# Patient Record
(demographics unavailable — no encounter records)

---

## 2025-04-03 NOTE — HISTORY OF PRESENT ILLNESS
[Patient reported mammogram was normal] : Patient reported mammogram was normal [Patient reported breast sonogram was normal] : Patient reported breast sonogram was normal [Patient reported PAP Smear was normal] : Patient reported PAP Smear was normal [Y] : Patient is sexually active [No] : Patient does not have concerns regarding sex [Currently Active] : currently active [Men] : men [TextBox_4] : 43YO patient presents for Annual and Amenorrhea [Mammogramdate] : 7/2023 [BreastSonogramDate] : 7/2023 [PapSmeardate] : 3/2024 [HPVDate] : 3/2024 [LMPDate] : 1/19/25 [PGHxTotal] : 5 [[facility]annerxFulerm] : 1 [PGHxPremature] : 0 [[facility]] : [facility] [PGHxABInduced] : 2 [PGHxABSpont] : 1 [PGHxEctopic] : 0 [PGHxMultBirths] : 0 [FreeTextEntry1] :

## 2025-04-03 NOTE — HISTORY OF PRESENT ILLNESS
[Patient reported mammogram was normal] : Patient reported mammogram was normal [Patient reported breast sonogram was normal] : Patient reported breast sonogram was normal [Patient reported PAP Smear was normal] : Patient reported PAP Smear was normal [Y] : Patient is sexually active [No] : Patient does not have concerns regarding sex [Currently Active] : currently active [Men] : men [TextBox_4] : 41YO patient presents for Annual and Amenorrhea [Mammogramdate] : 7/2023 [BreastSonogramDate] : 7/2023 [PapSmeardate] : 3/2024 [HPVDate] : 3/2024 [LMPDate] : 1/19/25 [PGHxTotal] : 5 [[facility]] : 1 [PGHxPremature] : 0 [[facility]] : 1 [PGHxABInduced] : 2 [PGHxABSpont] : 1 [PGHxEctopic] : 0 [PGHxMultBirths] : 0 [FreeTextEntry1] :

## 2025-04-03 NOTE — PHYSICAL EXAM
[Chaperone Present] : A chaperone was present in the examining room during all aspects of the physical examination [Appropriately responsive] : appropriately responsive [Alert] : alert [No Acute Distress] : no acute distress [No Lymphadenopathy] : no lymphadenopathy [Soft] : soft [Non-tender] : non-tender [Non-distended] : non-distended [Oriented x3] : oriented x3 [Examination Of The Breasts] : a normal appearance [Normal] : normal [No Masses] : no breast masses were palpable [FreeTextEntry2] : Volodymyr [FreeTextEntry6] : No tenderness, No nipple discharge and no adenopathy.

## 2025-04-03 NOTE — DISCUSSION/SUMMARY
[FreeTextEntry1] : GYN evaluation today TVS done today shows gestational sack @ 7wks1d - [id number] via ultrasound. By LMP should be [id number] We discussed -- Vaginitis and amenorrhea -- the results of sonogram unviable pregnancy and options for expulsion medication vs D&C. Patient verbalized understanding of all explanations, and her questions were answered, and all concerns were addressed. Patient was screened for depression - no signs of clinical depression, PHQ-2 on file. Advised to RTO in 1 week    IVolodymyr acting as scribe for Dr. Monsalve. 03/31/2025   The documentation recorded by the scribe, in my presence, accurately reflects the service I personally performed, and the decisions made by me with my edits as appropriate on 04/01/2025  Elizabeth Monsalve MD, FACOG

## 2025-04-03 NOTE — HISTORY OF PRESENT ILLNESS
[Patient reported mammogram was normal] : Patient reported mammogram was normal [Patient reported breast sonogram was normal] : Patient reported breast sonogram was normal [Patient reported PAP Smear was normal] : Patient reported PAP Smear was normal [Y] : Patient is sexually active [No] : Patient does not have concerns regarding sex [Currently Active] : currently active [Men] : men [TextBox_4] : 41YO patient presents for Annual and Amenorrhea [Mammogramdate] : 7/2023 [BreastSonogramDate] : 7/2023 [PapSmeardate] : 3/2024 [HPVDate] : 3/2024 [LMPDate] : 1/19/25 [PGHxTotal] : 5 [[facility]] : 1 [PGHxPremature] : 0 [[facility]iving] : 1 [PGHxABInduced] : 2 [PGHxABSpont] : 1 [PGHxEctopic] : 0 [PGHxMultBirths] : 0 [FreeTextEntry1] :

## 2025-04-03 NOTE — DISCUSSION/SUMMARY
[FreeTextEntry1] : GYN evaluation today TVS done today shows gestational sack @ 7wks1d - [id number] via ultrasound. By LMP should be [id number] We discussed -- Vaginitis and amenorrhea -- the results of sonogram unviable pregnancy and options for expulsion medication vs D&C. Patient verbalized understanding of all explanations, and her questions were answered, and all concerns were addressed. Patient was screened for depression - no signs of clinical depression, PHQ-2 on file. Advised to RTO in 1 week    IVolodymyr acting as scribe for Dr. Monsalve. 03/31/2025   The documentation recorded by the scribe, in my presence, accurately reflects the service I personally performed, and the decisions made by me with my edits as appropriate on 04/01/2025  Elizabeth Monsalve MD, FACOG

## 2025-04-03 NOTE — DISCUSSION/SUMMARY
[FreeTextEntry1] : GYN evaluation today TVS done today shows gestational sack @ 7wks1d - [id number] via ultrasound. By LMP should be [id number] We discussed -- Vaginitis and amenorrhea -- the results of sonogram unviable pregnancy and options for expulsion medication vs D&C. Patient verbalized understanding of all explanations, and her questions were answered, and all concerns were addressed. Patient was screened for depression - no signs of clinical depression, PHQ-2 on file. Advised to RTO in 1 week    IVolodymyr acting as scribe for Dr. Monsalve. 03/31/2025   The documentation recorded by the scribe, in my presence, accurately reflects the service I personally performed, and the decisions made by me with my edits as appropriate on 04/01/2025  Elizabeth Monsalve MD, FACOG